# Patient Record
Sex: FEMALE | Race: WHITE
[De-identification: names, ages, dates, MRNs, and addresses within clinical notes are randomized per-mention and may not be internally consistent; named-entity substitution may affect disease eponyms.]

---

## 2018-10-25 ENCOUNTER — HOSPITAL ENCOUNTER (INPATIENT)
Dept: HOSPITAL 80 - F3N | Age: 68
LOS: 2 days | Discharge: HOME | DRG: 460 | End: 2018-10-27
Attending: ORTHOPAEDIC SURGERY | Admitting: ORTHOPAEDIC SURGERY
Payer: COMMERCIAL

## 2018-10-25 DIAGNOSIS — M47.26: ICD-10-CM

## 2018-10-25 DIAGNOSIS — M43.16: Primary | ICD-10-CM

## 2018-10-25 DIAGNOSIS — M51.16: ICD-10-CM

## 2018-10-25 PROCEDURE — C1713 ANCHOR/SCREW BN/BN,TIS/BN: HCPCS

## 2018-10-25 RX ADMIN — DOCUSATE SODIUM AND SENNOSIDES SCH TAB: 50; 8.6 TABLET ORAL at 21:12

## 2018-10-25 RX ADMIN — DIAZEPAM PRN MG: 5 INJECTION, SOLUTION INTRAMUSCULAR; INTRAVENOUS at 15:52

## 2018-10-25 RX ADMIN — ACETAMINOPHEN SCH MG: 500 TABLET ORAL at 21:12

## 2018-10-25 RX ADMIN — DIAZEPAM PRN MG: 5 INJECTION, SOLUTION INTRAMUSCULAR; INTRAVENOUS at 15:19

## 2018-10-25 RX ADMIN — Medication SCH MLS: at 17:16

## 2018-10-25 RX ADMIN — OXYCODONE HYDROCHLORIDE PRN MG: 15 TABLET ORAL at 18:33

## 2018-10-25 NOTE — POSTOPPROG
Post Op Note


Date of Operation: 10/25/18


Surgeon: Dallin Jenkins


Assistant: Batsheva Heath PA-C


Anesthesiologist: Dr. Tra Lares


Anesthesia: GET(General Endotracheal)


Pre-op Diagnosis: lumbar stenosis


Post-op Diagnosis: lumbar stenosis


Indication: low back pain, lumbar radiculitis


Procedure: L2-L5 fusion/tlif


Inf/Abcess present in the surg proc area at time of surgery?: No


EBL: 100-500


Complications: 





none

## 2018-10-25 NOTE — PDANEPAE
ANE Past Medical History





- Cardiovascular History


Hx Hypertension: Yes


Hx Arrhythmias: No


Hx Chest Pain: No


Hx Coronary Artery / Peripheral Vascular Disease: No


Hx CHF / Valvular Disease: No


Hx Palpitations: No





- Pulmonary History


Hx COPD: No


Hx Asthma/Reactive Airway Disease: No


Hx Recent Upper Respiratory Infection: No


Hx Oxygen in Use at Home: No


Hx Sleep Apnea: No


Sleep Apnea Screening Result - Last Documented: Negative


Pulmonary History Comment: a little bit of wheezing, pcp ordered flonase, has 

controlled it





- Neurologic History


Hx Cerebrovascular Accident: No


Hx Seizures: No


Hx Dementia: No





- Endocrine History


Hx Diabetes: No


Hypothyroid: No


Hyperthyroid: No


Obesity: yes, mild





- Renal History


Hx Renal Disorders: No





- Liver History


Hx Hepatic Disorders: No





- Neurological & Psychiatric Hx


Hx Neurological and Psychiatric Disorders: No





- Cancer History


Hx Cancer: No





- Congenital Disorder History


Hx Congenital Disorders: No





- GI History


GERD: no


Hx Gastrointestinal Disorders: No





- Other Health History


Other Health History: wears reading glasses





- Chronic Pain History


Chronic Pain: Yes (back pain and bilateral knees)





- Surgical History


Prior Surgeries: 1967 oophorectomy x1.  1977 hemorrhoidectomy and tubal ligation





ANE Review of Systems


Review of Systems: 








- Exercise capacity


Exercise capacity: limited by disability


METS (RN): 4 METS





ANE Patient History





- Allergies


Allergies/Adverse Reactions: 








No Known Allergies Allergy (Verified 10/08/18 12:17)


 








- Home Medications


Home Medications: 








ACETAMINOPHEN  10/08/18 [Last Taken 10/24/18]


Flonase Allergy Relief  10/08/18 [Last Taken 10/24/18]


Herbals/Supplements -Info Only  10/08/18 [Last Taken 10/01/18]


Losartan Potassium 25 mg PO DAILY 10/08/18 [Last Taken 10/24/18]


Triamterene-Hctz 37.5-25 mg Tb PO DAILY 10/08/18 [Last Taken 10/24/18]








- NPO status


NPO Since - Liquids (Date): 10/25/18


NPO Since - Liquids (Time): 04:00


NPO Since - Solids (Date): 10/24/18


NPO Since - Solids (Time): 15:00





- Anes Hx


Anes Hx: no prior problems





- Smoking Hx


Smoking Status: Never smoked


Marijuana use: No





- Alcohol Use


Alcohol Use: Occasionally





- Family Anes Hx


Family Anes Hx: neg - N/A


Family Hx Anesthesia Complications: none





ANE Labs/Vital Signs





- Vital Signs


Blood Pressure: 156/92


Heart Rate: 70


Respiratory Rate: 15


O2 Sat (%): 91


Height: 165.1 cm


Weight: 90.718 kg





ANE Physical Exam





- Airway


Neck exam: decreased ROM


Mallampati Score: Class 3


Mouth exam: normal dental/mouth exam





- Pulmonary


Pulmonary: no respiratory distress, no rales or rhonchi, clear to auscultation





- Cardiovascular


Cardiovascular: regular rate and rhythym, no murmur, rub, or gallop





- ASA Status


ASA Status: II





ANE Anesthesia Plan


Anesthesia Plan: general endotracheal anesthesia


Total IV Anesthesia: No

## 2018-10-25 NOTE — PDMN
Medical Necessity


Medical necessity: Pt meets INPT criteria per MD and [facility] [place] [facility] (L2-5 

fusion, TLIF; [id number] [facility] [place]

## 2018-10-25 NOTE — SOAPPROG
SOAP Progress Note


Assessment/Plan: 


Assessment/Plan:


67y/o female s/p L2-L5 fusion/TLIF


- orders as written


- PCA only if needed


- PT/OT


- LSO


- TEDs/SCDs/early ambulation


- call with issues or concerns





10/25/18 15:16





Subjective: 





Back hurts, no leg pain


Objective: 





 Vital Signs











Temp Pulse Resp BP Pulse Ox


 


 36.8 C   70   15   156/92 H  91 L


 


 10/25/18 08:53  10/25/18 10:18  10/25/18 10:18  10/25/18 10:18  10/25/18 10:18








NAD, VSS


EOMi, face symmetric


LE strength intact


quads, hamstring, dorsiflexion, plantarflexion, EHL 5/5=B


sensation intact in BLE


incision clean, dressed





ICD10 Worksheet


Patient Problems: 


 Problems











Problem Status Onset


 


Lumbar stenosis Acute  














- ICD10 Problem Qualifiers


(1) Lumbar stenosis

## 2018-10-26 RX ADMIN — ACETAMINOPHEN SCH MG: 500 TABLET ORAL at 05:50

## 2018-10-26 RX ADMIN — Medication SCH MLS: at 02:33

## 2018-10-26 RX ADMIN — DOCUSATE SODIUM AND SENNOSIDES SCH TAB: 50; 8.6 TABLET ORAL at 09:06

## 2018-10-26 RX ADMIN — DOCUSATE SODIUM AND SENNOSIDES SCH TAB: 50; 8.6 TABLET ORAL at 22:26

## 2018-10-26 RX ADMIN — LOSARTAN POTASSIUM SCH MG: 25 TABLET, FILM COATED ORAL at 09:06

## 2018-10-26 RX ADMIN — ACETAMINOPHEN SCH MG: 500 TABLET ORAL at 14:07

## 2018-10-26 RX ADMIN — OXYCODONE HYDROCHLORIDE PRN MG: 15 TABLET ORAL at 02:37

## 2018-10-26 RX ADMIN — TRIAMTERENE AND HYDROCHLOROTHIAZIDE SCH EACH: 37.5; 25 TABLET ORAL at 09:06

## 2018-10-26 RX ADMIN — ACETAMINOPHEN SCH MG: 500 TABLET ORAL at 22:25

## 2018-10-26 NOTE — GPROG
I saw and evaluated this patient and I have discussed her plan of care with the patient, her ,
 as well as her nurse. 



On physical exam, she has no sensory, motor, or vascular deficits.  She only has back pain which is a
ppropriate.  Drain has a moderate amount of output and has been left in place.  The dressing did satu
rate through and the drain appears to have somewhat stopped, which these 2 things may be related.



IMPRESSION:  Postoperative day 1 status post L2-L5 posterior lumbar decompression and fusion with ins
trumentation.  



Imaging reviewed includes x-rays of lumbar spine which demonstrate well-positioned instrumentation wi
thout evidence of migration or failure.  She does have interval reduction of the spondylolisthesis at
 L3-4 and L4-5.



PLAN:  We will get the patient up today.  We will get her to wear the brace and work with physical th
erapy.  She can resume a regular diet.  Will work with physical therapy, but I think it may be reason
able to consider discharge home sometime tomorrow.  The patient was encouraged to call me if she has 
any further questions or concerns.





Job #:  [id number]

## 2018-10-26 NOTE — ASMTCMCOM
CM Note

 

CM Note                       

Notes:

Pt is a healthy 69 y/o female admitted for a lumber stenosis. PT has cleared pt to d/c without any 

needs. OT is still pending. CM available for d/c needs. 







Plan: Independent 

 

Date Signed:  10/26/2018 10:44 AM

Electronically Signed By:DELON He

## 2018-10-26 NOTE — GDS
This patient underwent an uneventful L2-L5 posterior lumbar decompression 
fusion with instrumentation on 10/25/2018.  She did well during the 
hospitalization and her drain was discontinued.  She progressed well with 
physical therapy and was subsequently cleared for discharge.  



The patient has all instructions in her postsurgical booklet.  She is not to 
bend, lift, or twist.  She can remove the dressing 24 hours after leaving the 
hospital and is to shower without having the dressing in place.  All 
medications have been prescribed previously.  She was told to avoid blood 
thinners for the next week and NSAIDs for the next 3 months.  She has my cell 
phone if she has any further questions or concerns.





Job #:  [id number]

MTDD

## 2018-10-27 VITALS — DIASTOLIC BLOOD PRESSURE: 78 MMHG | SYSTOLIC BLOOD PRESSURE: 148 MMHG

## 2018-10-27 RX ADMIN — OXYCODONE HYDROCHLORIDE PRN MG: 15 TABLET ORAL at 05:10

## 2018-10-27 RX ADMIN — ACETAMINOPHEN SCH: 500 TABLET ORAL at 05:12

## 2018-10-27 RX ADMIN — TRIAMTERENE AND HYDROCHLOROTHIAZIDE SCH EACH: 37.5; 25 TABLET ORAL at 08:31

## 2018-10-27 RX ADMIN — LOSARTAN POTASSIUM SCH MG: 25 TABLET, FILM COATED ORAL at 08:30

## 2018-10-27 RX ADMIN — DOCUSATE SODIUM AND SENNOSIDES SCH TAB: 50; 8.6 TABLET ORAL at 08:31

## 2018-10-27 NOTE — ASDISCHSUM
----------------------------------------------

Discharge Information

----------------------------------------------

Plan Status:Home with No Needs                       Medically Cleared to Leave:10/27/2018

Discharge Date:10/27/2018                            CM D/C Disposition:Home, Routine, Self-Care

ADT D/C Disposition:                                 Projected Discharge Date:10/27/2018

Transportation at D/C:Family                         Discharge Delay Reason:

Follow-Up Date:10/27/2018                            Discharge Slot:2 - 12:01 pm - 18:00 pm

Final Diagnosis:Lumbar-spinal stenosis w/ neurogenic claudication, s/p L2-L5 posterior lumbar decomp
ression

----------------------------------------------

Placement Information

----------------------------------------------

----------------------------------------------

Patient Contact Information

----------------------------------------------

Contact Name:MARIMAR                             Relationship:

Address:[address] [place] [address]                               Home [phone]

                                                     Work [phone]

City:[place]                                           Alternate Phone:

State/Zip Code:[place] [address]                              Email:

----------------------------------------------

Financial Information

----------------------------------------------

Financial Class:Medicare

Primary Plan Desc:MEDICARE INPATIENT                 Primary Plan Number:[id number]

Secondary Plan Desc:AARP/MDR SUPPLEMENT              Secondary Plan Number:[id number]

 

 

----------------------------------------------

Assessment Information

----------------------------------------------

----------------------------------------------

LACE

----------------------------------------------

LACE

 

Length of stay for            Answers:  2 days                                

current admission                                                             

Acuity / Level of             Answers:  Yes                                   

Care: Did the patient                                                         

have an inpatient                                                             

admission?                                                                    

Comorbidities - select        Answers:  Opioid dependence                     

all that apply                          / Chronic pain                        

                                        Other                         Notes:  HTN

# of Emergency department     Answers:  0                                     

visits in the last 6                                                          

months                                                                        

Score: 10

 

Date Signed:  10/27/2018 11:35 AM

Electronically Signed By:Kenia Flores RN

 

 

----------------------------------------------

[facility] CM Progress Note

----------------------------------------------

CM Note

 

CM Note                       

Notes:

Pt is a healthy 69 y/o female admitted for a lumber stenosis. PT has cleared pt to d/c without any 

needs. OT is still pending. CM available for d/c needs. 







Plan: Independent 

 

Date Signed:  10/26/2018 10:44 AM

Electronically Signed By:DELON He

 

 

----------------------------------------------

Case Management Discharge Plan Note

----------------------------------------------

Case Management Discharge

 

Discharge Order Complete?     Answers:  Yes                                   

Patient to Obtain             Answers:  Independently                         

Medications                                                                   

Transportation Arranged       Answers:  Family/Friends                        

Transport will Pick (Date     10/27/2018 12:00 AM

& Time)                       

EMTALA Complete               Answers:  No                            Notes:  N/A

Case Management Transport     Answers:  No                            Notes:  N/A

Form Complete                                                                 

Faxed Final Orders            Answers:  No                            Notes:  N/A

Agency/Facility Transfer      Answers:  No                            Notes:  N/A

Report Printed & Faxed to                                                     

Receiving Agency                                                              

Family Notified               Answers:  Yes                           Notes:   at bedside

Discharge Comments            

Notes:

Reviewed chart regarding discharge plan of care, pt's progress. Pt is s/p L2-L5 posterior lumbar 

decompression. Per MD notes, pt to discharge home independently with family support and no 

identified needs. PT cleared pt for home with use of a front-wheeled walker. OT recommends HHC with 


24 hr supervision. Spoke with RAINA Saleh - per Delfino, pt safe to discharge without HHC, "pt feeling 

more confident today." Pt to follow up as directed. IM signed. CM available for any further issues 

or concerns.



Discharge Plan: Home independently with family support

 

Date Signed:  10/27/2018 11:44 AM

Electronically Signed By:Kenia Flores RN

 

 

----------------------------------------------

Intervention Information

----------------------------------------------

## 2018-10-27 NOTE — GPROG
I saw the patient at the bedside with her  this morning.  Overall, she 
is in good spirits.  She is having pain, which has since been controlled on 
oral regimen.  Drain output has decreased, and the patient is neurologically 
intact.  She notes relief of the leg symptoms that she had before the operation.



ASSESSMENT AND PLAN:  We will discharge the patient when she feels comfortable.
  That may be later today.  We will remove the drain today.  The patient is 
discharged.  She will see me in the office in 2 weeks' time and knows all 
postoperative instructions at this point.





Job #:  [id number]

MTDD

## 2018-10-27 NOTE — ASMTLACE
LACE

 

Length of stay for            Answers:  2 days                                

current admission                                                             

Acuity / Level of             Answers:  Yes                                   

Care: Did the patient                                                         

have an inpatient                                                             

admission?                                                                    

Comorbidities - select        Answers:  Opioid dependence                     

all that apply                          / Chronic pain                        

                                        Other                         Notes:  HTN

# of Emergency department     Answers:  0                                     

visits in the last 6                                                          

months                                                                        

Score: 10

 

Date Signed:  10/27/2018 11:35 AM

Electronically Signed By:Kenia Flores RN

## 2018-10-27 NOTE — ASMTDCNOTE
Case Management Discharge

 

Discharge Order Complete?     Answers:  Yes                                   

Patient to Obtain             Answers:  Independently                         

Medications                                                                   

Transportation Arranged       Answers:  Family/Friends                        

Transport will Pick (Date     10/27/2018 12:00 AM

& Time)                       

EMTALA Complete               Answers:  No                            Notes:  N/A

Case Management Transport     Answers:  No                            Notes:  N/A

Form Complete                                                                 

Faxed Final Orders            Answers:  No                            Notes:  N/A

Agency/Facility Transfer      Answers:  No                            Notes:  N/A

Report Printed & Faxed to                                                     

Receiving Agency                                                              

Family Notified               Answers:  Yes                           Notes:   at bedside

Discharge Comments            

Notes:

Reviewed chart regarding discharge plan of care, pt's progress. Pt is s/p L2-L5 posterior lumbar 

decompression. Per MD notes, pt to discharge home independently with family support and no 

identified needs. PT cleared pt for home with use of a front-wheeled walker. OT recommends HHC with 


24 hr supervision. Spoke with RAINA Saleh - per Delfino, pt safe to discharge without HHC, "pt feeling 

more confident today." Pt to follow up as directed. IM signed. CM available for any further issues 

or concerns.



Discharge Plan: Home independently with family support

 

Date Signed:  10/27/2018 11:44 AM

Electronically Signed By:Kenia Flores RN

## 2018-12-19 ENCOUNTER — HOSPITAL ENCOUNTER (INPATIENT)
Dept: HOSPITAL 80 - FSGY | Age: 68
LOS: 4 days | Discharge: HOME HEALTH | DRG: 863 | End: 2018-12-23
Attending: ORTHOPAEDIC SURGERY | Admitting: ORTHOPAEDIC SURGERY
Payer: COMMERCIAL

## 2018-12-19 DIAGNOSIS — R01.1: ICD-10-CM

## 2018-12-19 DIAGNOSIS — Z98.1: ICD-10-CM

## 2018-12-19 DIAGNOSIS — T81.42XA: Primary | ICD-10-CM

## 2018-12-19 DIAGNOSIS — I10: ICD-10-CM

## 2018-12-19 LAB — PLATELET # BLD: 320 10^3/UL (ref 150–400)

## 2018-12-19 PROCEDURE — C1751 CATH, INF, PER/CENT/MIDLINE: HCPCS

## 2018-12-19 PROCEDURE — 0J9700Z DRAINAGE OF BACK SUBCUTANEOUS TISSUE AND FASCIA WITH DRAINAGE DEVICE, OPEN APPROACH: ICD-10-PCS | Performed by: ORTHOPAEDIC SURGERY

## 2018-12-19 RX ADMIN — METHOCARBAMOL PRN MG: 750 TABLET ORAL at 22:50

## 2018-12-19 NOTE — SUROPNOTE
[facility] Operative Report





- Surgery





SURGEON:  Dallin Jenkins MD





ASSISTANT:  None.





ANESTHESIA:  General.





PREOPERATIVE DIAGNOSIS:  Lumbar spine infection.





POSTOPERATIVE DIAGNOSIS:  Lumbar spine infection.





PROCEDURE PERFORMED:  Lumbar spine irrigation and debridement, application of 

nondurable negative pressure 


device.





FINDINGS: Gross drainage throughout the lumbar spine, to the bone and spinal 

instrumentation.





SPECIMENS:  Cultures x4 (2 superficial, 2 deep).





ESTIMATED BLOOD LOSS:  50 cc.





INDICATIONS:  The patient is a very pleasant 68-year-old female who underwent a 

lumbar spine decompression and 


fusion with instrumentation about 8 weeks ago.  He was doing well until about a 

week ago when he was noted to have 


some drainage from the incision.  He was indicated for surgery by me after 

onset of malaise and persistent drainage 


despite antibiotic administration.





DESCRIPTION OF PROCEDURE:  A surgical time-out was performed and all parties 

were in agreement on the 


procedure to be performed, antibiotics were held for the procedure, and 

preoperative tranexamic acid was 


administered for the purpose of limiting blood loss. 





Approach:  The incision was opened up and immediate gush of dishwater-type 

fluid was returned.  This was cultured 


along with some trace amounts of tissue.  These were sent as superficial 

cultures.  Antibiotics were administered at 


this time.  The fascia was then opened up as it was noted to be somewhat boggy.

  Another immediate rush of fluid 


with abundant fibrinous tissue and purulence was noted.  This was also sent for 

culture as well. 





Irrigation and debridement:  At this time, 3 L of saline was used to debride 

the lumbar spine and any bits of tissue that 


were noted were scraped out.  Curettes were scraped on all surfaces here as 

well.  At this time, Betadine was placed 


into the surgical wound itself and left to sit there for approximately 4 

minutes.  This was then irrigated out with 3  additional L of saline 

impregnated with antibiotic. 





Attention was then turned toward closure.  #1 Vicryl were used to reapproximate 

the muscle layer.  A deep drain was 


placed deep to this layer.  #1 Vicryl were also used in both an interrupted and 

running fashion to close the fascia. 2-0 


Monocryl sutures were used to reapproximate the deep dermal layers and a 4-0 

Stratafix was used to approximate 


the skin.  Mastisol was then applied and a negative pressure wound VAC was then 

applied to the skin.  The suction 


was noted to be good. 





I was present for the entirety of the procedure.





DRAINS:  Negative pressure incisional VAC, deep drain (deep to the fascia).





COMPLICATIONS:  None.





CONDITION:  Stable to PACU.





ASSESSMENT AND PLAN:  The patient will be returned to the PACU.  I have since 

discussed with Jackelyn Horan MD 


regarding infectious disease consultation.  At this point, we will put her on 

some empiric antibiotics and leave the rest 


to ID, to be consulted tomorrow.  He does not need to respect spine 

precautions.  Further medical management will be per ID


and we will admit the patient for at least 24 hours to come up with a plan 

regarding infection.

## 2018-12-19 NOTE — PDANEPAE
ANE History of Present Illness





lumbar spine I&D





ANE Past Medical History





- Cardiovascular History


Hx Hypertension: Yes


Hx Arrhythmias: No


Hx Chest Pain: No


Hx Coronary Artery / Peripheral Vascular Disease: No


Hx CHF / Valvular Disease: No


Hx Palpitations: No





- Pulmonary History


Hx COPD: No


Hx Asthma/Reactive Airway Disease: No


Hx Recent Upper Respiratory Infection: No


Hx Oxygen in Use at Home: No


Hx Sleep Apnea: No


Sleep Apnea Screening Result - Last Documented: Negative


Pulmonary History Comment: a little bit of wheezing, pcp ordered flonase, has 

controlled it





- Neurologic History


Hx Cerebrovascular Accident: No


Hx Seizures: No


Hx Dementia: No





- Endocrine History


Hx Diabetes: No





- Renal History


Hx Renal Disorders: No





- Liver History


Hx Hepatic Disorders: No





- Neurological & Psychiatric Hx


Hx Neurological and Psychiatric Disorders: No





- Cancer History


Hx Cancer: No





- Congenital Disorder History


Hx Congenital Disorders: No





- GI History


Hx Gastrointestinal Disorders: No





- Other Health History


Other Health History: wears reading glasses





- Chronic Pain History


Chronic Pain: Yes (back pain and bilateral knees)





- Surgical History


Prior Surgeries: 1967 oophorectomy x1.  1977 hemorrhoidectomy and tubal ligation





ANE Review of Systems


Review of systems is: negative


Review of Systems: 








- Exercise capacity


Exercise capacity: limited by disability





ANE Patient History





- Allergies


Allergies/Adverse Reactions: 








No Known Allergies Allergy (Verified 10/08/18 12:17)


 








- Home Medications


Home medications: home medication list seen and reviewed


Home Medications: 








Acetaminophen [Tylenol  mg (*)] 500 mg PO Q4 PRN 10/25/18 [Last Taken 12/ 19/18]


Fluticasone Nasal [Flonase Nasal Spray (RX)] 1 sprays NASAL DAILY PRN 10/25/18 [

Last Taken Unknown]


Losartan Potassium [Cozaar 25 mg (*)] 25 mg PO DAILY 10/25/18 [Last Taken 12/19/ 18]


Triamterene/Hydrochlorothiazid [Triamterene-Hctz 37.5-25 mg Tb] 1 each PO DAILY 

10/25/18 [Last Taken 12/19/18]


Diclofenac Sodium 1% [Voltaren Gel (*)] 1 mike TP DAILY PRN 12/19/18 [Last Taken 

12/19/18]


Tetrahydrozoline 0.05% [Visine (*)] 1 drop EACHEYE DAILY PRN 12/19/18 [Last 

Taken Unknown]








- NPO status


NPO Status: no food or drink >8 hours


NPO Since - Liquids (Date): 12/19/18


NPO Since - Liquids (Time): 08:30


NPO Since - Solids (Date): 12/19/18


NPO Since - Solids (Time): 08:30





- Anes Hx


Anes Hx: no prior problems





- Smoking Hx


Smoking Status: Never smoked





- Family Anes Hx


Family Anes Hx: none


Family Hx Anesthesia Complications: none





ANE Labs/Vital Signs





- Labs


Result Diagrams: 


 12/19/18 14:03





 12/19/18 14:03





- Vital Signs


Vital Signs: reviewed preoperatively; see RN documention for details


Blood Pressure: 122/74


Heart Rate: 73


Respiratory Rate: 16


O2 Sat (%): 92


Height: 162.56 cm


Weight: 87.09 kg





ANE Physical Exam





- Airway


Neck exam: decreased ROM


Mallampati Score: Class 2


Mouth exam: abnormal chin





- Pulmonary


Pulmonary: no respiratory distress





- Cardiovascular


Cardiovascular: regular rate and rhythym





- ASA Status


ASA Status: II





ANE Anesthesia Plan


Anesthesia Plan: general endotracheal anesthesia

## 2018-12-19 NOTE — GHP
The patient underwent an uneventful lumbar decompression and fusion with instrumentation approximatel
y 7 weeks ago.  She was noted to have a draining incision today.  I saw her in the clinic.  Clearly, 
there is some sort of sinus tract deep.  I think we should perform an irrigation and debridement.



PHYSICAL EXAMINATION:  Draining sinus tract.  No sensory, motor, or vascular deficits noted.  Mild er
ythema noted around the incision.



IMPRESSION:  Possible lumbar spine infection.



ASSESSMENT AND PLAN:  We will admit the patient.  Get blood cultures, urinalysis, chest x-ray and sim
ple labs.  We will make the patient n.p.o. and hopefully get her on for an irrigation and debridement
 of the lumbar spine tonight.  The patient expressed understanding of the plan.





Job #:  [id number]

## 2018-12-20 LAB — PLATELET # BLD: 276 10^3/UL (ref 150–400)

## 2018-12-20 RX ADMIN — ACETAMINOPHEN PRN MG: 325 TABLET ORAL at 22:47

## 2018-12-20 RX ADMIN — Medication SCH MLS: at 10:26

## 2018-12-20 RX ADMIN — DOCUSATE SODIUM AND SENNOSIDES SCH TAB: 50; 8.6 TABLET ORAL at 20:53

## 2018-12-20 RX ADMIN — DOCUSATE SODIUM AND SENNOSIDES SCH: 50; 8.6 TABLET ORAL at 10:30

## 2018-12-20 RX ADMIN — Medication SCH MLS: at 21:55

## 2018-12-20 RX ADMIN — ACETAMINOPHEN PRN MG: 325 TABLET ORAL at 16:43

## 2018-12-20 RX ADMIN — METHOCARBAMOL PRN MG: 750 TABLET ORAL at 20:54

## 2018-12-20 RX ADMIN — FAMOTIDINE SCH MG: 20 TABLET, FILM COATED ORAL at 20:54

## 2018-12-20 RX ADMIN — FAMOTIDINE SCH: 20 TABLET, FILM COATED ORAL at 10:30

## 2018-12-20 NOTE — ASMTCMCOM
CM Note

 

CM Note                       

Notes:

Pt met with [employer]  who provided education and cost information on medications/ insurance 

coverage. Pt and family agreeable with [facility] through [facility].  They said they want 


to come into [facility] in [place] for nursing services, instead of having Home Nursing. Pt 

needs health teaching on using eclipse ball prior to discharge. If pt is agreeable, [facility] is 

able to service pt. 



Pt's PCP is Dr. Gil at [facility] in [place]. 

 

Date Signed:  12/20/2018 03:30 PM

Electronically Signed By:DELON Marroquin

## 2018-12-20 NOTE — GPROG
DATE OF SERVICE:  12/20/2018



I saw evaluated the patient this morning.  Overall, she is doing quite well.  She reports no pain, as
shaylee from some minor pain in the back.  I also touched base with the Infectious Disease specialist.



PHYSICAL EXAM:  BACK:  Both the drain and her incisional VAC are in place with good suction.  She has
 no sensory, motor, or vascular deficits.



LABORATORY DATA:  Chart was reviewed, which demonstrate 4+ PMNs with 1+ gram-positive cocci.



IMPRESSION:  Postoperative day 1 status post irrigation, debridement for confirmed infection.



ASSESSMENT/PLAN:  I will defer decision making regarding antibiotic administration at this point to I
nfectious Disease.  It is more than likely she will need a peripherally inserted central catheter adrianna
e, as well as long-term antibiotic administration per them.  At this point, it will be routine orthop
edic management throughout her hospital stay.





Job #:  [id number]

## 2018-12-20 NOTE — ASMTCMCOM
CM Note

 

CM Note                       

Notes:

Pt presents with ID of lumbar. CM discussed with MD. Pt getting pic line tomorrow, likely discharge 


this weekend. [facility] services will follow. CM submit referral to [facility]. CM to follow. 



Plan: [facility] with [facility].

 

Date Signed:  12/20/2018 10:03 AM

Electronically Signed By:DELON Marroquin

## 2018-12-20 NOTE — PDMN
Medical Necessity


Medical necessity: [facility] or Procedure GR yo s/p lumbar 

fusion 8wks ago admitted for infection to surgical site, incision and drainage 

completed by neurosurgery, noted gross drainage throughout lumbar spine, to 

bone and spinal instrumentation.  Wound vac placed.  Cultures pending.  

Infectious disease to follow. Anticipate>2MN for IV antibx, infection 

management.

## 2018-12-20 NOTE — ASMTCMCOM
CM Note

 

CM Note                       

Notes:

Pt reports they may want to come into the clinic for infusion services depending on 

frequency. Irena from [facility] will let us know cost of home infusion. CM to follow. 

Plan: infusion at [facility] clinic VS home w/homecare & infusion services.

 

Date Signed:  12/20/2018 11:21 AM

Electronically Signed By:DELON Marroquin

## 2018-12-20 NOTE — PDCONSULT
Consultant Note: 





INFECTIOUS DISEASE CONSULTATION 





A/P Post op wound infection down to bone/hardware s/p washout.  Gram stain with 

GPC


--Rx vancomycin until more information about GPC known.  Vancomycin 1gm IV q12h


--check trough before 4th dose


--PICC line after blood cultures negative 48hours


--IV antibiotic duration 8 weeks 


--reviewed general risks of IV antibiotic therapy and will provide more 

specifics when specific agent selected based on cultures


--reviewed risk of Cdiff


--discussed need for home IV abx w case management


--add on CRP and LFTs to labs





# Soft systolic murmur: unclear significance, will sort out based on results of 

blood cultures








--------------------------------------------------------------------------------

---------------------------------------------------------


Date of consultation: 12/20/18





MD requesting consult: Dr. Edgar Jenkins





History of present illness: 


68 year-old female who underwent posterior lumbar decompression fusion on 10/25/

18. Perioperatively she received cefazolin 2 gm and vancomycin 1 gm. 

Postoperatively, she notes that she recovered rapidly only needing 

acetaminophen while in the hospital. After d/c took codeine at night and was "

up and at them pretty quickly" with the exception of bending, lifting, and 

twisting (per surgical orders). She proceeded to go on a relaxing vacation and 

wore a "bag brace" during the 4th week s/p discharge. On 12/07 was seen by her 

surgeon Dr. Jenkins with no new concerns and was cleared. Subsequently, she 

purchased Mederma scar gel that she began applying to her incision. About 2.5 

weeks later she began to notice some drainage from the surgical incision and 

notifed surgeon about 1 week ago. Dr. Edgar Jenkins evaluated and elected to 

debride yesterday.  Denies other signs of infection Denies associated fevers or 

resurgence of back discomfort with the exception of some skin tenderness 

surrounding the incision. Here at [facility] she presented with nonhealing draining 

wound, went to the OR with washout noted for purulence described as dishwater 

type fluid. 





Other information: NKDA to medications, does report seasonal allergies tx with 

Flonase. 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 which is where she resides. Was born in [place], [place]. Has lived in 

[place] and [place] in childhood but primarily was raised in [place], [place]. Pt has a cat and a dog at home. She does see a cardiologist at [facility]. No children





PMHx: HTN


Surgical hx: oophorectomy in 1969, tubal ligation, tonsillectomy in childhood


Social hx: drinks etoh x6-8 drinks per month, no children


Family hx: both parents were smokers, but she was never a smoker 





Allergies:  


 3





Allergy/AdvReac Type Severity Reaction Status Date / Time


 


No Known Allergies Allergy   Verified 10/08/18 12:17








Medications: 


 3





Generic Name Dose Route Start Last Admin





  Trade Name Freq  PRN Reason Stop Dose Admin


 


Bisacodyl  10 mg  12/19/18 21:57  





  Dulcolax Rectal  OH  06/17/19 21:56  





  DAILY PRN   





  Constipation   





  Protocol   


 


Famotidine  20 mg  12/20/18 09:00  





  Pepcid  PO  06/18/19 08:59  





  BID BHARATH   


 


Lactated Ringer's  1,000 mls @ 125 mls/hr  12/19/18 16:00  12/19/18 23:00





  Lr  IV  06/17/19 15:59  1,000 mls





  CONT BHARATH   Administration


 


Cefazolin Sodium/Dextrose  100 mls @ 200 mls/hr  12/20/18 05:00  12/20/18 05:03





  Ancef  IV  12/20/18 13:29  100 mls





  Q8H BHARATH   Administration





  Protocol   


 


Lactulose  20 gm  12/19/18 21:57  





  Cephulac  PO  06/17/19 21:56  





  TID PRN   





  Constipation   





  Protocol   


 


Magnesium Hydroxide  30 ml  12/19/18 21:57  





  Milk Of Magnesia  PO  06/17/19 21:56  





  DAILY PRN   





  Constipation   





  Protocol   


 


Methocarbamol  750 mg  12/19/18 21:57  12/19/18 22:50





  Robaxin  PO  06/17/19 21:56  750 mg





  QID PRN   Administration





  Spasms   


 


Morphine Sulfate  2 - 4 mg  12/19/18 21:57  12/19/18 22:51





  Morphine  IVP  12/29/18 21:56  2 mg





  Q1HR PRN   Administration





  Pain, Breakthrough   


 


Polyethylene Glycol  17 gm  12/19/18 21:57  





  Miralax  PO  06/17/19 21:56  





  DAILY PRN   





  Constipation, patient prefers   





  Protocol   


 


Senna/Docusate Sodium  1 - 2 tab  12/20/18 09:00  





  Senokot-S  PO  06/18/19 08:59  





  BID BHARATH   





  Protocol   








ROS: 10 systems were reviewed and negative with the exception of the elements 

mentioned in the history of present illness. 





Vitals: 


 3





Temp Pulse Resp BP Pulse Ox


 


 37.2 C   82   13   125/68 H  90 L


 


 12/20/18 08:00  12/20/18 08:00  12/20/18 08:00  12/20/18 08:00  12/20/18 08:00








Physical exam: 


General: Well-nourish, well-developed, in no acute distress, nontoxic. 


HEENT: No scleral icterus, no conjunctival injection. Oropharynx with moist 

mucous membranes, no thrush or no oral ulcerations, dentition in good repair 

with extensive prior dental work.


Respiratory: Clear to auscultation bilaterally without adventitious sounds. 

Normal respiratory effort. 


Cardiovascular: Regular rate and rhythm, 2/6 systolic murmur loudest at LUSB, 

no rubs or gallops. No lower extremity edema. 


Back: Lumbar spine with incisional wound VAC in place. STEVO drain - bloody fluid


Skin: Warm and dry to touch. No rashes present. 


Neuro: Alert and oriented. Moving all 4 extremities. 





Laboratory results: 


 3





WBC  6.10 10^3/uL (3.80-9.50)   12/20/18  05:08    


 


RBC  3.87 10^6/uL (4.18-5.33)  L  12/20/18  05:08    


 


Hgb  11.7 g/dL (12.6-16.3)  L  12/20/18  05:08    


 


Hct  35.6 % (38.0-47.0)  L  12/20/18  05:08    


 


MCV  92.0 fL (81.5-99.8)   12/20/18  05:08    


 


MCH  30.2 pg (27.9-34.1)   12/20/18  05:08    


 


MCHC  32.9 g/dL (32.4-36.7)   12/20/18  05:08    


 


RDW  13.6 % (11.5-15.2)   12/20/18  05:08    


 


Plt Count  276 10^3/uL (150-400)   12/20/18  05:08    


 


MPV  9.8 fL (8.7-11.7)   12/20/18  05:08    


 


Neut % (Auto)  88.3 % (39.3-74.2)  H  12/20/18  05:08    


 


Lymph % (Auto)  8.4 % (15.0-45.0)  L  12/20/18  05:08    


 


Mono % (Auto)  2.8 % (4.5-13.0)  L  12/20/18  05:08    


 


Eos % (Auto)  0.0 % (0.6-7.6)  L  12/20/18  05:08    


 


Baso % (Auto)  0.2 % (0.3-1.7)  L  12/20/18  05:08    


 


Nucleat RBC Rel Count  0.0 % (0.0-0.2)   12/20/18  05:08    


 


Absolute Neuts (auto)  5.39 10^3/uL (1.70-6.50)   12/20/18  05:08    


 


Absolute Lymphs (auto)  0.51 10^3/uL (1.00-3.00)  L  12/20/18  05:08    


 


Absolute Monos (auto)  0.17 10^3/uL (0.30-0.80)  L  12/20/18  05:08    


 


Absolute Eos (auto)  0.00 10^3/uL (0.03-0.40)  L  12/20/18  05:08    


 


Absolute Basos (auto)  0.01 10^3/uL (0.02-0.10)  L  12/20/18  05:08    


 


Absolute Nucleated RBC  0.00 10^3/uL (0-0.01)   12/20/18  05:08    


 


Immature Gran %  0.3 % (0.0-1.1)   12/20/18  05:08    


 


Immature Gran #  0.02 10^3/uL (0.00-0.10)   12/20/18  05:08    


 


RBC/WBC/PLT Morphology  TNP   12/20/18  05:08    


 


Platelet Estimate  TNP   12/20/18  05:08    


 


ESR  23 MM/HR (0-30)   12/19/18  14:03    


 


Sodium  134 mEq/L (135-145)  L  12/20/18  05:08    


 


Potassium  4.1 mEq/L (3.5-5.2)   12/20/18  05:08    


 


Chloride  101 mEq/L ()   12/20/18  05:08    


 


Carbon Dioxide  24 mEq/l (22-31)   12/20/18  05:08    


 


Anion Gap  9 mEq/L (6-14)   12/20/18  05:08    


 


BUN  18 mg/dL (7-23)   12/20/18  05:08    


 


Creatinine  0.7 mg/dL (0.6-1.0)   12/20/18  05:08    


 


Estimated GFR  > 60   12/20/18  05:08    


 


Glucose  156 mg/dL ()  H  12/20/18  05:08    


 


Calcium  9.0 mg/dL (8.5-10.4)   12/20/18  05:08    


 


C-React Prot High Sens  > 15.0 mg/L  12/19/18  14:03    


 


Urine Color  PALE YELLOW   12/19/18  15:05    


 


Urine Appearance  CLEAR   12/19/18  15:05    


 


Urine pH  6.0  (5.0-7.5)   12/19/18  15:05    


 


Ur Specific Gravity  1.010  (1.002-1.030)   12/19/18  15:05    


 


Urine Protein  NEGATIVE  (NEGATIVE)   12/19/18  15:05    


 


Urine Ketones  NEGATIVE  (NEGATIVE)   12/19/18  15:05    


 


Urine Blood  NEGATIVE  (NEGATIVE)   12/19/18  15:05    


 


Urine Nitrate  NEGATIVE  (NEGATIVE)   12/19/18  15:05    


 


Urine Bilirubin  NEGATIVE  (NEGATIVE)   12/19/18  15:05    


 


Urine Urobilinogen  NEGATIVE EU (0.2-1.0)   12/19/18  15:05    


 


Ur Leukocyte Esterase  NEGATIVE  (NEGATIVE)   12/19/18  15:05    


 


Urine Glucose  NEGATIVE  (NEGATIVE)   12/19/18  15:05    








Microbiology:


12/19/18 OR gram stains positive for GPC.


12/19/18 Blood cultures (2) pending.





Imagining studies: 


12/19/18 CXR shows no infiltrates and tortuous aorta.





Scribe attestation: 


I, Raissa Granados, am scribing for, and in the presence of, Jackelyn Horan MD. 


I, Jackelyn Horan MD, personally performed the services described in this 

documentation, as scribed by Raissa Granados in my presence and it is both 

accurate and complete. 








Time 75 min >50% time spent with education and counseling regarding PICC Line 

risks, antibiotic risks duration and need for suppressive antibiotic therapy.

## 2018-12-20 NOTE — CPEKG
Test Reason : OPEN

Blood Pressure : ***/*** mmHG

Vent. Rate : 072 BPM     Atrial Rate : 072 BPM

   P-R Int : 175 ms          QRS Dur : 090 ms

    QT Int : 393 ms       P-R-T Axes : 026 026 029 degrees

   QTc Int : 431 ms

 

Sinus rhythm

 

Confirmed by Prakash Galvez ([id number]) on 12/20/2018 6:05:12 AM

 

Referred By:             Confirmed By:Prakash Galvez

## 2018-12-21 PROCEDURE — 02H633Z INSERTION OF INFUSION DEVICE INTO RIGHT ATRIUM, PERCUTANEOUS APPROACH: ICD-10-PCS | Performed by: RADIOLOGY

## 2018-12-21 RX ADMIN — Medication SCH MLS: at 10:01

## 2018-12-21 RX ADMIN — Medication SCH MLS: at 22:20

## 2018-12-21 RX ADMIN — METHOCARBAMOL PRN MG: 750 TABLET ORAL at 03:03

## 2018-12-21 RX ADMIN — METHOCARBAMOL PRN MG: 750 TABLET ORAL at 20:04

## 2018-12-21 RX ADMIN — DOCUSATE SODIUM AND SENNOSIDES SCH TAB: 50; 8.6 TABLET ORAL at 08:00

## 2018-12-21 RX ADMIN — ACETAMINOPHEN PRN MG: 325 TABLET ORAL at 06:19

## 2018-12-21 RX ADMIN — FAMOTIDINE SCH MG: 20 TABLET, FILM COATED ORAL at 08:01

## 2018-12-21 RX ADMIN — DOCUSATE SODIUM AND SENNOSIDES SCH TAB: 50; 8.6 TABLET ORAL at 20:04

## 2018-12-21 RX ADMIN — ACETAMINOPHEN PRN MG: 325 TABLET ORAL at 16:55

## 2018-12-21 RX ADMIN — FAMOTIDINE SCH MG: 20 TABLET, FILM COATED ORAL at 20:04

## 2018-12-21 RX ADMIN — ACETAMINOPHEN PRN MG: 325 TABLET ORAL at 23:37

## 2018-12-21 NOTE — PCMIDPN
Assessment/Plan: 


Assessment/Plan:


* Postoperative back infection due to Finegoldia status post incision and 

drainage:  Continue vancomycin with anticipated trough level this p.m..  

Treatment options post discharge include daptomycin 6 milligrams/kilogram IV Q 

24 hr versus vancomycin dosed q.12 hours versus vancomycin by continuous 

infusion over 24 hr.  Will review with case management tomorrow to determine 

feasibility of options and cost.  Patient prefers to administer antibiotics 

after education with labs being done in [place] at our office (vancomycin labs 

typically twice weekly while daptomycin labs are typically once weekly).  Risks 

and benefits of PICC line including risk of PICC associated DVT or infection 

discussed with patient today.  Anticipate 8 week course of IV antibiotics based 

on deep back involvement with need for suppressive antibiotics thereafter given 

retained hardware.





12/21/18 18:41





Subjective: 





Patient status post PICC line insertion.  Eager for hospital discharge.


Objective: 


 Vital Signs











Temp Pulse Resp BP Pulse Ox


 


 36.8 C   88   14   128/82 H  90 L


 


 12/21/18 16:00  12/21/18 16:00  12/21/18 16:00  12/21/18 16:00  12/21/18 16:00








 Microbiology











 12/19/18 20:52 Gram Stain - Final





 Back - Eswab 


 


 12/19/18 20:57 Gram Stain - Final





 Back - Eswab 


 


 12/19/18 20:57 Mycobacterial Smear (SOLA) - Final





 Back - Eswab Mycobacterial Culture - Final


 


 12/19/18 20:52 Mycobacterial Smear (SOLA) - Final





 Back - Eswab Mycobacterial Culture - Final








 Laboratory Results





 12/20/18 05:08 





 12/20/18 05:08 





 











 12/20/18 12/21/18 12/22/18





 05:59 05:59 05:59


 


Intake Total 8842 2500 2000


 


Output Total 1050 2700 3101


 


Balance 7792 -200 -1101








 











ESR  23 MM/HR (0-30)   12/19/18  14:03    


 


C-Reactive Protein  37.5 mg/L (<10.0)  H  12/20/18  05:08    








Vancomycin # 2


Back cultures with growth of Finegoldia magna


Blood cultures x2 no growth





- Physical Exam


General Appearance: alert, no apparent distress


EENT: No scleral icterus, No thrush, No conjunctival petechiae


Respiratory: lungs clear, No respiratory distress


Cardiac/Chest: regular rate, rhythm, No systolic murmur


Abdomen: non-tender


Back: other (Incisional wound VAC in place)





- Line/s


  ** RUE PICC


Lines: No drainage, No erythema





- Time Spent With Patient


Time Spent with Patient: greater than 35 minutes


Time Spent with Patient: Greater than 35 minutes spent on this patients care, 

greater than 50% of time spent counseling, educating, and coordinating care 

regarding the above mentioned plan.





ICD10 Worksheet


Patient Problems: 


 Problems











Problem Status Onset


 


Lumbar stenosis Acute

## 2018-12-21 NOTE — ASMTCMCOM
CM Note

 

CM Note                       

Notes:

[facility] representative met with CM re pt's infusions.  [facility]'s understanding is that a [facility] nurse 

will teach pt how to use eclipse ball prior to discharge.  [facility] will deliver infusion supplies 

to pt in hospital.  They need to be informed of d/c 4 hours prior in order for them to deliver 

supplies prior to her d/c from hospital.



D/C Plan:  Independent with training from [facility] nurse, delivery of infusion supplies from [facility] and 


OP nurse.

 

Date Signed:  12/21/2018 11:30 AM

Electronically Signed By:Kerry Asencio

## 2018-12-22 RX ADMIN — DOCUSATE SODIUM AND SENNOSIDES SCH: 50; 8.6 TABLET ORAL at 21:39

## 2018-12-22 RX ADMIN — DOCUSATE SODIUM AND SENNOSIDES SCH: 50; 8.6 TABLET ORAL at 11:00

## 2018-12-22 RX ADMIN — FAMOTIDINE SCH MG: 20 TABLET, FILM COATED ORAL at 10:59

## 2018-12-22 RX ADMIN — METHOCARBAMOL PRN MG: 750 TABLET ORAL at 21:02

## 2018-12-22 RX ADMIN — ACETAMINOPHEN PRN MG: 325 TABLET ORAL at 11:01

## 2018-12-22 RX ADMIN — ACETAMINOPHEN PRN MG: 325 TABLET ORAL at 21:02

## 2018-12-22 RX ADMIN — METHOCARBAMOL PRN MG: 750 TABLET ORAL at 04:26

## 2018-12-22 RX ADMIN — Medication SCH MLS: at 22:01

## 2018-12-22 RX ADMIN — FAMOTIDINE SCH: 20 TABLET, FILM COATED ORAL at 21:40

## 2018-12-22 RX ADMIN — Medication SCH MLS: at 11:00

## 2018-12-22 NOTE — PDIAF
- Diagnosis


Diagnosis: Postoperative back infection


Code Status: Full Code





- Medication Management


Long Term Antibiotics: Vancomycin 2.5 g IV Q 24 hr by continuous infusion


Long Term Antibiotic Stop Date: 02/13/18


Discharge Medications: electronically signed and located in the Home Medication 

List.


PICC Care - Routine: Yes





- Orders


Diet Recommendation: no restrictions on diet


Diet Texture: Regular Texture Diet





- Labs/Radiology


CBC w/diff Date: 12/24/18 (Weekly Q Monday)


CMP Date: 12/24/18 (Weekly Q Monday)


Vanco Trough Date and Time: Random vancomycin level twice weekly


Call or Fax Lab and Imaging Results to: Dr. Guzman, [phone]





- Follow Up Care


Current Providers and Referrals: 


GUILLERMO HOOK NP [Other]

## 2018-12-22 NOTE — ASMTCMCOM
CM Note

 

CM Note                       

Notes:

Reviewed chart, spoke with Dr. Guzman, Dr. Jenkins and Kerry, RN regarding discharge plan of 

care, pt's progress. Per Dr. Jenkins, pt ready for discharge today. Call received from Dr. Guzman 

regarding discharge antibiotics - pt will require 8 weeks of IV antibiotic therapy. Per 

Dr. Guzman, pt able to choose between Vanco 1.25 gm BID, Vanco 2.5 gm continuous infusion or 

Daptomycin 525 mg every day. 



Met with pt and pt's  to discuss antibiotic choices, home care and home infusion needs. Pt 

[place] home care [place] people to access 

their house." Pt also reports feeling as though having home care would be an "intrusion." Pt 

willing to manage her own IV therapy with regular follow up at Dr. Guzman's office. Dr. Guzman to the 

bedside; pt questions answered regarding differences in antibiotics and follow up. Pt and  

requesting cost differences for each option.



Call placed to Irena [phone] at [facility]. Discussed options for antibiotics. Irena to [place] 

and call CM back. Call received from Irena - Vanco 1.25 gm BID and Vanco 2.5 gm continuous 

infusion both cost the same amount. Medicare part B cost is $150.00 for Vanco, plus a Medicare part 


D daily per marlon of $35.00. Total cost $395.00/week (7 days). Daptomycin 525 mg daily Medicare part 


B cost is $1220.00 for Dapto, plus a Medicare part D daily per marlon of $25.00. Total cost 

$1395.00/week (7 days). Cost options discussed with pt and . Pt elected to discharge with 

Vancomycin 2.5 gm continuous infusion for discharge.



Update provided to RAINA Payne and Dr. Guzman. Call placed to Irena with [facility]. Per Irena, unable 

to send antibiotics via  in time for a discharge this evening. Plan for [facility] to send 

antibiotics and supplies to hospital late tonight via . Pt to receive Vanco and PICC line 

teaching at 9:00 am on Sunday 12/23/18. Melvin with Pharmacy at [facility], Irena with [facility] and floor 

RN to conference together for teaching. Update provided to 

Mindi Payne, ARTEMIO, Dr. Guzman, Dr. Jenkins, pt and pt's . Everyone in agreement of plan.



Floor RN to call Irena with [facility] at 9:00 Sunday for teaching [phone]. Irena to assist 

via telephone conference. Discharge orders and PICC report faxed to [facility] via 

[email or web address]; confirmed receipt with Irena. Pt to discharge home independently following teaching in 


the morning on Sunday 12/23/18. Pt will follow up with Dr. Guzman on Monday 12/24/18 for a random 

Vanco level. Pt to follow up twice per week for 8 weeks. Dr. Guzman's office RN to provide PICC 

dressing changes and care. CM will continue to follow.



Discharge Plan: Home with [facility]

 

Date Signed:  12/22/2018 06:04 PM

Electronically Signed By:Kenia Flores RN

## 2018-12-22 NOTE — GPROG
Overall, she has been quite well.  She reports feeling better.  Drain has 
slowed down to minimal output. Skin is intact.  She has no sensory, motor, or 
vascular deficits and has no lower extremity complaints.  



I reviewed the note from Infectious Disease from their consultation.  At this 
point, they have recommended 8 weeks of antibiotics and they plan to place her 
on a PICC. I will see the patient tomorrow and as long as antibiotics have been 
arranged, I am more than comfortable with discharging to home tomorrow.





Job #:  [id number]

MTDD

## 2018-12-22 NOTE — PCMIDPN
Assessment/Plan: 


Assessment/Plan:


* Postoperative back infection due to Finegoldia status post incision and 

drainage:  Tolerating vancomycin well to date.  After further evaluation of 

potential treatment options, patient has decided on continuous infusion of 

vancomycin.  Anticipate 8 weeks of therapy.  This will be followed by 

suppressive oral antibiotic therapy given presence of indwelling hardware.  

Patient is planning to forego home care so will need to come to our office 

twice weekly for lab testing.  Anticipate weekly CBC and CMP with twice weekly 

creatinine and random vancomycin level.  Risk and benefits of PICC line 

including potential for DVT or infection discussed with patient.  Side effects 

of vancomycin including potential for allergic reactions, rash, kidney toxicity

, laboratory abnormalities, or hearing loss discussed with patient.  Anticipate 

discharge home in a.m. based on need for logistics of continuous infusion 

vancomycin to be arranged.  I have asked for lab to send Finegoldia isolate out 

for susceptibilities to include penicillin, doxycycline, ceftriaxone, 

vancomycin and daptomycin.








12/22/18 16:17





12/22/18 16:20





Subjective: 





Patient without specific complaints.  Eager to go home.  No issues related to 

vancomycin infusion.


Objective: 


 Vital Signs











Temp Pulse Resp BP Pulse Ox


 


 36.6 C   78   16   129/81 H  94 


 


 12/22/18 07:03  12/22/18 07:03  12/22/18 07:03  12/22/18 07:03  12/22/18 07:03








 Microbiology











 12/19/18 20:52 Gram Stain - Final





 Back - Eswab 


 


 12/19/18 20:57 Gram Stain - Final





 Back - Eswab 


 


 12/19/18 20:57 Mycobacterial Smear (SOLA) - Final





 Back - Eswab Mycobacterial Culture - Final


 


 12/19/18 20:52 Mycobacterial Smear (SOLA) - Final





 Back - Eswab Mycobacterial Culture - Final








 Laboratory Results





 12/20/18 05:08 





 12/22/18 04:23 





 











 12/21/18 12/22/18 12/23/18





 05:59 05:59 05:59


 


Intake Total 2500 3000 


 


Output Total 2700 4401 


 


Balance -200 -1401 








 











ESR  23 MM/HR (0-30)   12/19/18  14:03    


 


C-Reactive Protein  37.5 mg/L (<10.0)  H  12/20/18  05:08    








Vancomycin # 3


Blood cultures x2 no growth


Back cultures with growth of Finegoldia magna





- Physical Exam


General Appearance: alert, no apparent distress





- Time Spent With Patient


Time Spent with Patient: greater than 35 minutes


Time Spent with Patient: Greater than 35 minutes spent on this patients care, 

greater than 50% of time spent counseling, educating, and coordinating care 

regarding the above mentioned plan.





ICD10 Worksheet


Patient Problems: 


 Problems











Problem Status Onset


 


Lumbar stenosis Acute

## 2018-12-23 VITALS — SYSTOLIC BLOOD PRESSURE: 134 MMHG | DIASTOLIC BLOOD PRESSURE: 85 MMHG

## 2018-12-23 RX ADMIN — FAMOTIDINE SCH: 20 TABLET, FILM COATED ORAL at 10:22

## 2018-12-23 RX ADMIN — DOCUSATE SODIUM AND SENNOSIDES SCH: 50; 8.6 TABLET ORAL at 10:22

## 2018-12-23 NOTE — ASMTLACE
LACE

 

Length of stay for            Answers:  3 days                                

current admission                                                             

Acuity / Level of             Answers:  Yes                                   

Care: Did the patient                                                         

have an inpatient                                                             

admission?                                                                    

Comorbidities - select        Answers:  Opioid dependence                     

all that apply                          / Chronic pain                        

                                        Other                         Notes:  HTN

# of Emergency department     Answers:  0                                     

visits in the last 6                                                          

months                                                                        

Score: 11

 

Date Signed:  12/23/2018 10:58 AM

Electronically Signed By:Jesica Almaguer RN

## 2018-12-23 NOTE — ASMTDCNOTE
Case Management Discharge

 

Discharge Order Complete?     Answers:  Yes                                   

Patient to Obtain             Answers:  Independently                         

Medications                                                                   

Transportation Arranged       Answers:  Family/Friends                        

Discharge Comments            

Notes:

Patient medically cleared for discharge. She will go home with Home infusion services no [facility] per 

patient choice.

 

Date Signed:  12/23/2018 11:00 AM

Electronically Signed By:Jesica Almaguer RN

## 2018-12-24 NOTE — ASDISCHSUM
----------------------------------------------

Discharge Information

----------------------------------------------

Plan Status:IV ABX/Infusion                          Medically Cleared to Leave:12/22/2018

Discharge Date:12/23/2018 11:02 AM                   CM D/C Disposition:Home Health Service

ADT D/C Disposition:Home Health Service              Projected Discharge Date:12/22/2018 11:00 AM

Transportation at D/C:Family                         Discharge Delay Reason:

Follow-Up Date:12/22/2018 11:00 AM                   Discharge Slot:1 - 8:01 am - 12:00 noon

Final Diagnosis:

----------------------------------------------

Placement Information

----------------------------------------------

Referral Type:Home Infusion                          Referral ID:[id number]

Provider Name:[facility] [place]

Address 1:[address]                Phone [phone]

Address 2:                                           Fax [phone]

City:[place]                                      Selection Factors:

State:[place]

 

Referral Type:*Home Health Care Services             Referral ID:[id number]

Provider Name:

Address 1:                                           Phone Number:

Address 2:                                           Fax Number:

City:                                                Selection Factors:

State:

 

----------------------------------------------

Patient Contact Information

----------------------------------------------

Contact Name:MARIMAR Ureña:

Address:[address] [place] [address]                               Home [phone]

                                                     Work [phone]

City:[place]                                           Alternate Phone:

State/Zip Code:[place] [address]                              Email:

----------------------------------------------

Financial Information

----------------------------------------------

Financial Class:Medicare

Primary Plan Desc:MEDICARE INPATIENT                 Primary Plan Number:[id number]

Secondary Plan Desc:DOREEN/MARV SUPPLEMENT              Secondary Plan Number:[id number]

 

 

----------------------------------------------

Assessment Information

----------------------------------------------

----------------------------------------------

LACE

----------------------------------------------

LACE

 

Length of stay for            Answers:  3 days                                

current admission                                                             

Acuity / Level of             Answers:  Yes                                   

Care: Did the patient                                                         

have an inpatient                                                             

admission?                                                                    

Comorbidities - select        Answers:  Opioid dependence                     

all that apply                          / Chronic pain                        

                                        Other                         Notes:  HTN

# of Emergency department     Answers:  0                                     

visits in the last 6                                                          

months                                                                        

Score: 11

 

Date Signed:  12/23/2018 10:58 AM

Electronically Signed By:Jesica Almaguer RN

 

 

----------------------------------------------

[facility] CM Progress Note

----------------------------------------------

CM Note

 

CM Note                       

Notes:

Pt presents with ID of lumbar. CM discussed with MD. Pt getting pic line tomorrow, likely discharge 


this weekend. [facility] will follow. CM submit referral to [facility]. CM to follow. 



Plan: [facility] with [facility].

 

Date Signed:  12/20/2018 10:03 AM

Electronically Signed By:DELON Marroquin

 

 

----------------------------------------------

[facility] CM Progress Note

----------------------------------------------

CM Note

 

CM Note                       

Notes:

Pt reports they may want to come into the clinic for infusion services depending on 

frequency. Irena from [facility] will let us know cost of home infusion. CM to follow. 

Plan: infusion at [facility] clinic VS home w/homecare & infusion services.

 

Date Signed:  12/20/2018 11:21 AM

Electronically Signed By:DELON Marroquin

 

 

----------------------------------------------

[facility] CM Progress Note

----------------------------------------------

CM Note

 

CM Note                       

Notes:

Pt met with [facility] who provided education and cost information on medications/ insurance 

coverage. Pt and family agreeable with Home Infusion Services through [facility].  They said they want 


to come into [facility] in [place] for nursing services, instead of having Home Nursing. Pt 

needs health teaching on using eclipse ball prior to discharge. If pt is agreeable, [facility] is 

able to service pt. 



Pt's PCP is Dr. Gil at [facility] in [place]. 

 

Date Signed:  12/20/2018 03:30 PM

Electronically Signed By:DELON Marroquin

 

 

----------------------------------------------

[facility] CM Progress Note

----------------------------------------------

CM Note

 

CM Note                       

Notes:

[facility] representative met with CM re pt's infusions.  [facility]'s understanding is that a [facility] nurse 

will teach pt how to use eclipse ball prior to discharge.  [facility] will deliver infusion supplies 

to pt in hospital.  They need to be informed of d/c 4 hours prior in order for them to deliver 

supplies prior to her d/c from hospital.



D/C Plan:  Independent with training from [facility] nurse, delivery of infusion supplies from [facility] and 


OP nurse.

 

Date Signed:  12/21/2018 11:30 AM

Electronically Signed By:Kerry Asencio

 

 

----------------------------------------------

[facility] CM Progress Note

----------------------------------------------

CM Note

 

CM Note                       

Notes:

Reviewed chart, spoke with Dr. Guzman, Dr. Jenkins and Kerry, RN regarding discharge plan of 

care, pt's progress. Per Dr. Jenkins, pt ready for discharge today. Call received from Dr. Guzman 

regarding discharge antibiotics - pt will require 8 weeks of IV antibiotic therapy. Per 

Dr. Guzman, pt able to choose between Vanco 1.25 gm BID, Vanco 2.5 gm continuous infusion or 

Daptomycin 525 mg every day. 



Met with pt and pt's  to discuss antibiotic choices, home care and home infusion needs. Pt 

decl[place] home care [place] people to access 

their house." Pt also reports feeling as though having home care would be an "intrusion." Pt 

willing to manage her own IV therapy with regular follow up at Dr. Guzman's office. Dr. Guzman to the 

bedside; pt questions answered regarding differences in antibiotics and follow up. Pt and  

requesting cost differences for each option.



Call placed to Irena [phone] at [facility]. Discussed options for antibiotics. Irena to esparza 

and call CM back. Call received from Irena - Vanco 1.25 gm BID and Vanco 2.5 gm continuous 

infusion both cost the same amount. Medicare part B cost is $150.00 for Vanco, plus a Medicare part 


D daily per marlon of $35.00. Total cost $395.00/week (7 days). Daptomycin 525 mg daily Medicare part 


B cost is $1220.00 for Dapto, plus a Medicare part D daily per marlon of $25.00. Total cost 

$1395.00/week (7 days). Cost options discussed with pt and . Pt elected to discharge with 

Vancomycin 2.5 gm continuous infusion for discharge.



Update provided to Kerry, RN and Dr. Guzman. Call placed to Irena with [facility]. Per Irena, unable 

to send antibiotics via  in time for a discharge this evening. Plan for corieta to send 

antibiotics and supplies to hospital late tonight via . Pt to receive Vanco and PICC line 

teaching at 9:00 am on Sunday 12/23/18. Melvin with Pharmacy at [facility], Irena with [facility] and floor 

RN to conference together for teaching. Update provided to 

Mindi Payne, CTL, Dr. Guzman, Dr. Jenkins, pt and pt's . Everyone in agreement of plan.



Floor RN to call Irena with [facility] at 9:00 Sunday for teaching [phone]. Irena to assist 

via telephone conference. Discharge orders and PICC report faxed to [facility] via 

[facility]; confirmed receipt with Irena. Pt to discharge home independently following teaching in 


the morning on Sunday 12/23/18. Pt will follow up with Dr. Guzman on Monday 12/24/18 for a random 

Vanco level. Pt to follow up twice per week for 8 weeks. Dr. Guzman's office RN to provide PICC 

dressing changes and care. CM will continue to follow.



Discharge Plan: Home with [facility]

 

Date Signed:  12/22/2018 06:04 PM

Electronically Signed By:Kenia Flores RN

 

 

----------------------------------------------

Case Management Discharge Plan Note

----------------------------------------------

Case Management Discharge

 

Discharge Order Complete?     Answers:  Yes                                   

Patient to Obtain             Answers:  Independently                         

Medications                                                                   

Transportation Arranged       Answers:  Family/Friends                        

Discharge Comments            

Notes:

Patient medically cleared for discharge. She will go home with Home infusion services no [facility] per 

patient choice.

 

Date Signed:  12/23/2018 11:00 AM

Electronically Signed By:Jesica Almaguer RN

 

 

----------------------------------------------

Intervention Information

----------------------------------------------

## 2018-12-30 ENCOUNTER — HOSPITAL ENCOUNTER (OUTPATIENT)
Dept: HOSPITAL 80 - FSGY | Age: 68
Discharge: HOME | End: 2018-12-30
Attending: ORTHOPAEDIC SURGERY
Payer: COMMERCIAL

## 2018-12-30 VITALS — DIASTOLIC BLOOD PRESSURE: 66 MMHG | SYSTOLIC BLOOD PRESSURE: 128 MMHG

## 2018-12-30 DIAGNOSIS — I10: ICD-10-CM

## 2018-12-30 DIAGNOSIS — T81.40XA: Primary | ICD-10-CM

## 2018-12-30 DIAGNOSIS — Z98.1: ICD-10-CM

## 2018-12-30 RX ADMIN — Medication PRN MCG: at 10:06

## 2018-12-30 RX ADMIN — Medication PRN MCG: at 09:42

## 2018-12-30 RX ADMIN — Medication PRN MCG: at 10:27

## 2018-12-30 RX ADMIN — Medication PRN MCG: at 10:33

## 2018-12-30 NOTE — PDANEPAE
ANE History of Present Illness





I & D of  lumbar spine infection, s/p  fusion





ANE Past Medical History





- Cardiovascular History


Hx Hypertension: Yes


Hx Arrhythmias: No


Hx Chest Pain: No


Hx Coronary Artery / Peripheral Vascular Disease: No


Hx CHF / Valvular Disease: No


Hx Palpitations: No





- Pulmonary History


Hx COPD: No


Hx Asthma/Reactive Airway Disease: No


Hx Recent Upper Respiratory Infection: No


Hx Oxygen in Use at Home: No


Hx Sleep Apnea: No


Pulmonary History Comment: a little bit of wheezing, pcp ordered flonase, has 

controlled it





- Neurologic History


Hx Cerebrovascular Accident: No


Hx Seizures: No


Hx Dementia: No





- Endocrine History


Hx Diabetes: No


Hypothyroid: No


Hyperthyroid: No


Obesity: moderate





- Renal History


Hx Renal Disorders: No





- Liver History


Hx Hepatic Disorders: No





- Neurological & Psychiatric Hx


Hx Neurological and Psychiatric Disorders: No





- Cancer History


Hx Cancer: No





- Congenital Disorder History


Hx Congenital Disorders: No





- GI History


GERD: no


Hx Gastrointestinal Disorders: No





- Other Health History


Other Health History: wears reading glasses





- Chronic Pain History


Chronic Pain: Yes (back pain and bilateral knees)





- Surgical History


Prior Surgeries: 1967 oophorectomy x1.  1977 hemorrhoidectomy and tubal 

ligation.  Lumbar fusion L2-5





ANE Review of Systems


Review of Systems: 








- Exercise capacity


METS (RN): 4 METS





ANE Patient History





- Allergies


Allergies/Adverse Reactions: 








No Known Allergies Allergy (Verified 12/30/18 06:19)


 








- Home Medications


Home Medications: 








Losartan Potassium [Cozaar 25 mg (*)] 25 mg PO DAILY 10/25/18 [Last Taken 12/19/ 18]


Hydrochlorothiazide  12/30/18 [Last Taken Unknown]








- NPO status


NPO Since - Liquids (Date): 12/30/18


NPO Since - Liquids (Time): 00:00


NPO Since - Solids (Date): 12/30/18


NPO Since - Solids (Time): 00:00





- Anes Hx


Anes Hx: no prior problems





- Smoking Hx


Smoking Status: Never smoked





- Alcohol Use


Alcohol Use: Occasionally





- Family Anes Hx


Family Anes Hx: none


Family Hx Anesthesia Complications: none





ANE Labs/Vital Signs





- Vital Signs


Blood Pressure: 156/83


Heart Rate: 75


Respiratory Rate: 16


O2 Sat (%): 94


Height: 160.02 cm


Weight: 87.09 kg





ANE Physical Exam





- Airway


Neck exam: FROM


Mallampati Score: Class 1


Mouth exam: normal dental/mouth exam





- Pulmonary


Pulmonary: clear to auscultation





- Cardiovascular


Cardiovascular: regular rate and rhythym





- ASA Status


ASA Status: II





ANE Anesthesia Plan


Anesthesia Plan: general endotracheal anesthesia

## 2018-12-30 NOTE — PDGENHP
History & Physical


Chief Complaint: lumbar spine drainage s/p lumbar fusion and decompression


History of Present Illness: lumbar spine fusion 10/2018, now with persistent 

drainage.


Pertinent Past, Social, Family History: reviewed and noncontributory


Relevant Physical Exam: draining lumbar wound. no le deficits


Cardiorespiratory Assessment: ok

## 2018-12-30 NOTE — SUROPNOTE
[facility] Operative Report





- Surgery





Date: 12/30/18





SURGEON:  Dallin Jenkins MD





ASSISTANT:  None.





ANESTHESIA:  General.





PREOPERATIVE DIAGNOSIS:  Lumbar spine infection.





POSTOPERATIVE DIAGNOSIS:  Lumbar spine infection, superficial to fascia.





PROCEDURE PERFORMED:  Lumbar spine irrigation and debridement, application of 

nondurable negative pressure device.





FINDINGS: Minimal purulence superficial to fascia, no gross communication with 

deep instrumentation





SPECIMENS:  Cultures x4 (2 superficial, 2 deep).





ESTIMATED BLOOD LOSS:  50 cc.





INDICATIONS:  The patient is a very pleasant 68-year-old female who underwent a 

lumbar spine decompression and fusion with instrumentation about 8 weeks ago.  

She was doing well until about a 2 weeks ago when she was noted to have some 

drainage from the incision.  She had some recurrent drainage. She was indicated 

for surgery by me after persistent drainage despite antibiotic administration.





DESCRIPTION OF PROCEDURE:  A surgical time-out was performed and all parties 

were in agreement on the procedure to be performed, antibiotics were held for 

the procedure, and preoperative tranexamic acid was administered for the 

purpose of limiting blood loss. 





Approach:  The incision was opened up and immediate gush of dishwater-type 

fluid was returned.  





This was cultured along with some trace amounts of tissue.  These were sent as 

superficial cultures.  Antibiotics were administered at this time.  The fascia 

was then opened up and noted to appear good. Small amounts of tissue were also 

sent. A sterile-appearing sermoa was present.  Cultures were sent from deep 

fluid.





Irrigation and debridement:  At this time, 3 L of saline was used to debride 

the lumbar spine and any bits of tissue that were noted were scraped out.  

Curettes were scraped on all surfaces here as well.  At this time, Betadine was 

placed 


into the surgical wound itself and left to sit there for approximately 4 

minutes.  This was then irrigated out with 3  additional L of saline 

impregnated with antibiotic. 





Attention was then turned toward closure.  #1 Vicryl were used to reapproximate 

the muscle layer.  A deep drain was placed deep to this layer.  #1 Vicryl were 

also used in both an interrupted and running fashion to close the fascia. 2-0 


Monocryl sutures were used to reapproximate the deep dermal layers and a 4-0 

Stratafix was used to approximate the skin.  A running/locking 2-0 nylon was 

used to close the skin as well. Mastisol was then applied and a negative 

pressure wound VAC was then applied to the skin.  


The suction was noted to be good. 





I was present for the entirety of the procedure.





DRAINS:  Negative pressure incisional VAC, deep drain (deep to the fascia).





COMPLICATIONS:  None.





CONDITION:  Stable to PACU.





ASSESSMENT AND PLAN:  The patient will be returned to the PACU.  I have since 

discussed with Jackelyn Horan MD regarding infectious disease consultation. She 

already has vancomycin being administered and there is no absolute need to 

admit her unless she requires it for pain control. I will call her later today 

if discharged home.